# Patient Record
(demographics unavailable — no encounter records)

---

## 2025-03-21 NOTE — HISTORY OF PRESENT ILLNESS
[FreeTextEntry1] : Pt reports feeling stable since last visit. She is walking more after knee replacements. She denies CP or SOB. No palpitation, dizziness, or LOC. She states she hasn't taken atorvastatin 80 in 1-2 months as her blood tests was normal. Last LDL noted to be 72 while taking atorvastatin 80 daily.  10/15/24 - Pt reports productive cough 2 weeks ago, saw PCP who gave her antibiotics. For the past few days, she has b/l arm discomfort that is not related to exertion. She continues to report rare episodes of dyspnea while laying down in bed at night. She has no chest pain or SOB with exertion.  9/27/24 - Pt is here for follow up. She underwent b/l knee replacements over the past several months. 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 For the past 4 months, she noticed SOB when laying down in bed, requiring her to sit up. This has been occurring almost every day. No PND or LE edema. She saw PCP who gave her Omeprazole with some improvement. She denies CP or palpitations. Pt denies h/o syncope. Pt denies any bleeding issues with ASA. Today's /85 P 86.  3/8/24 - She has been stable without CP or SOB. No orthopnea, PND, LE edema, dizziness, palpitations, or LOC. She is awaiting L knee replacement surgery next Wednesday. She currently walks around with a cane due to severe b/l knee pains.  9/1/23 - She has no complaints. She reports continued b/l knee arthritis that limits her walking. She denies any chest pain, SOB, orthopnea, PND or leg pain. She was advised to undergo arterial duplex but she ended up getting GOLDEN/PVR with toe pressures at [facility] 6/9/23 which were WNL.  4/14/23 - Today, pt denies any CP or SOB. She reports b/l knee arthritis but she does not want surgery. She denies any leg pain/claudication with walking. She denies any calf symptoms at rest. She has no open wounds on her legs. GOLDEN in 7/20/22 noted R GOLDEN 1.58 and L GLODEN 1.45, likely due to calcification of vessels.  Last seen by Dr. Lopez 2/8/23: GOLDEN test was reviewed by Dr. Lassiter, thought to be normal so no changes to management at the time. Pt underwent stress echo 1/11/20 which was normal and baseline TTE also normal.

## 2025-03-21 NOTE — ASSESSMENT
[FreeTextEntry1] : 70 year old F with history of DM, HLD, knee arthritis s/p b/l knee replacements 2024 who presents for f/u  1) HLD 2) DM - Given DM, I advised pt to keep taking statin therapy. She was on atorvastatin 80 previously with LDL 72. I advised pt she can take atorvastatin 40mg daily instead - Continue good DM control - Dietary/lifestyle changes  3) Abnormal GOLDEN, - She has no symptoms of claudication - GOLDEN in PCP office 7/20/22 noted R GOLDEN 1.58 and L GOLDEN 1.45. - Pt underwent GOLDEN/PVR with toe pressures at [facility] 6/9/23 which were WNL - Continue ASA 81 and atorvastatin 40mg daily as above  4) Follow-up, 6 months or sooner if symptomatic

## 2025-03-21 NOTE — REVIEW OF SYSTEMS
[facility]       Dr. Maria De Jesus Gant, DARNELL Jones, MIGDALIAN     [phone]    [place] [address] [address]    [address]    [place]  [address]  [address]     [place] [address] [address]    
[Negative] : Heme/Lymph

## 2025-03-21 NOTE — REASON FOR VISIT
[Hyperlipidemia] : hyperlipidemia [CV Risk Factors and Non-Cardiac Disease] : CV risk factors and non-cardiac disease [FreeTextEntry1] : 70 year old F with history of DM, HLD, knee arthritis s/p b/l knee replacements 2024 who presents for f/u  Pt is on ASA 81, metformin 500 bid. She ran out of atorvastatin 80mg daily for past 1-2 months.  TTE 10/15/24 showed grossly normal LVEF 60-65%, normal RV size/function, mild TR, trace MR, and trace AI